# Patient Record
Sex: FEMALE
[De-identification: names, ages, dates, MRNs, and addresses within clinical notes are randomized per-mention and may not be internally consistent; named-entity substitution may affect disease eponyms.]

---
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
## 2024-10-02 ENCOUNTER — EMERGENCY (EMERGENCY)
Facility: HOSPITAL | Age: 24
LOS: 1 days | Discharge: HOME | End: 2024-10-02
Attending: STUDENT IN AN ORGANIZED HEALTH CARE EDUCATION/TRAINING PROGRAM | Admitting: STUDENT IN AN ORGANIZED HEALTH CARE EDUCATION/TRAINING PROGRAM
Payer: MEDICAID

## 2024-10-02 VITALS
OXYGEN SATURATION: 97 % | HEIGHT: 62 IN | SYSTOLIC BLOOD PRESSURE: 130 MMHG | TEMPERATURE: 98 F | RESPIRATION RATE: 16 BRPM | WEIGHT: 149.91 LBS | HEART RATE: 86 BPM | DIASTOLIC BLOOD PRESSURE: 84 MMHG

## 2024-10-02 LAB
APPEARANCE UR: CLEAR — SIGNIFICANT CHANGE UP
BACTERIA # UR AUTO: ABNORMAL /HPF
BILIRUB UR-MCNC: ABNORMAL
COLOR SPEC: ABNORMAL
DIFF PNL FLD: NEGATIVE — SIGNIFICANT CHANGE UP
EPI CELLS # UR: PRESENT
GLUCOSE UR QL: NEGATIVE MG/DL — SIGNIFICANT CHANGE UP
HCG UR QL: NEGATIVE — SIGNIFICANT CHANGE UP
HCV AB S/CO SERPL IA: 0.16 S/CO — SIGNIFICANT CHANGE UP (ref 0–0.99)
HCV AB SERPL-IMP: SIGNIFICANT CHANGE UP
HIV 1 & 2 AB SERPL IA.RAPID: SIGNIFICANT CHANGE UP
KETONES UR-MCNC: NEGATIVE MG/DL — SIGNIFICANT CHANGE UP
LEUKOCYTE ESTERASE UR-ACNC: ABNORMAL
NITRITE UR-MCNC: POSITIVE
PH UR: 5 — SIGNIFICANT CHANGE UP (ref 5–8)
PROT UR-MCNC: NEGATIVE MG/DL — SIGNIFICANT CHANGE UP
RBC CASTS # UR COMP ASSIST: 1 /HPF — SIGNIFICANT CHANGE UP (ref 0–4)
SP GR SPEC: 1.01 — SIGNIFICANT CHANGE UP (ref 1–1.03)
UROBILINOGEN FLD QL: 1 MG/DL — SIGNIFICANT CHANGE UP (ref 0.2–1)
WBC UR QL: 2 /HPF — SIGNIFICANT CHANGE UP (ref 0–5)

## 2024-10-02 PROCEDURE — 81001 URINALYSIS AUTO W/SCOPE: CPT

## 2024-10-02 PROCEDURE — 87086 URINE CULTURE/COLONY COUNT: CPT

## 2024-10-02 PROCEDURE — 99284 EMERGENCY DEPT VISIT MOD MDM: CPT

## 2024-10-02 PROCEDURE — 36415 COLL VENOUS BLD VENIPUNCTURE: CPT

## 2024-10-02 PROCEDURE — 87591 N.GONORRHOEAE DNA AMP PROB: CPT

## 2024-10-02 PROCEDURE — 99283 EMERGENCY DEPT VISIT LOW MDM: CPT | Mod: 25

## 2024-10-02 PROCEDURE — 86703 HIV-1/HIV-2 1 RESULT ANTBDY: CPT

## 2024-10-02 PROCEDURE — 86803 HEPATITIS C AB TEST: CPT

## 2024-10-02 PROCEDURE — 81025 URINE PREGNANCY TEST: CPT

## 2024-10-02 RX ORDER — ACETAMINOPHEN 325 MG/1
650 TABLET ORAL ONCE
Refills: 0 | Status: COMPLETED | OUTPATIENT
Start: 2024-10-02 | End: 2024-10-02

## 2024-10-02 RX ORDER — NITROFURANTOIN MONOHYD/M-CRYST 100 MG
100 CAPSULE ORAL ONCE
Refills: 0 | Status: COMPLETED | OUTPATIENT
Start: 2024-10-02 | End: 2024-10-02

## 2024-10-02 RX ORDER — NITROFURANTOIN MONOHYD/M-CRYST 100 MG
1 CAPSULE ORAL
Qty: 14 | Refills: 0
Start: 2024-10-02 | End: 2024-10-08

## 2024-10-02 RX ADMIN — ACETAMINOPHEN 650 MILLIGRAM(S): 325 TABLET ORAL at 01:58

## 2024-10-02 RX ADMIN — Medication 100 MILLIGRAM(S): at 03:46

## 2024-10-02 NOTE — ED PROVIDER NOTE - NSFOLLOWUPINSTRUCTIONS_ED_ALL_ED_FT
Please take the antibiotic as prescribed and follow up with your primary care doctor.  Return to the ER for persistent burning urination, fever, bloody urine, back pain, vomiting, abdominal pain, or any other concerns.     Urinary Tract Infection, Adult       A urinary tract infection (UTI) is an infection of any part of the urinary tract. The urinary tract includes the kidneys, ureters, bladder, and urethra. These organs make, store, and get rid of urine in the body.    Your health care provider may use other names to describe the infection. An upper UTI affects the ureters and kidneys (pyelonephritis). A lower UTI affects the bladder (cystitis) and urethra (urethritis).    What are the causes?  Most urinary tract infections are caused by bacteria in your genital area, around the entrance to your urinary tract (urethra). These bacteria grow and cause inflammation of your urinary tract.    What increases the risk?  You are more likely to develop this condition if:    You have a urinary catheter that stays in place (indwelling).  You are not able to control when you urinate or have a bowel movement (you have incontinence).  You are female and you:    Use a spermicide or diaphragm for birth control.  Have low estrogen levels.  Are pregnant.  You have certain genes that increase your risk (genetics).  You are sexually active.  You take antibiotic medicines.  You have a condition that causes your flow of urine to slow down, such as:    An enlarged prostate, if you are male.  Blockage in your urethra (stricture).  A kidney stone.  A nerve condition that affects your bladder control (neurogenic bladder).  Not getting enough to drink, or not urinating often.  You have certain medical conditions, such as:    Diabetes.  A weak disease-fighting system (immunesystem).  Sickle cell disease.  Gout.  Spinal cord injury.    What are the signs or symptoms?  Symptoms of this condition include:    Needing to urinate right away (urgently).  Frequent urination or passing small amounts of urine frequently.  Pain or burning with urination.  Blood in the urine.  Urine that smells bad or unusual.  Trouble urinating.  Cloudy urine.  Vaginal discharge, if you are female.  Pain in the abdomen or the lower back.    You may also have:    Vomiting or a decreased appetite.  Confusion.  Irritability or tiredness.  A fever.  Diarrhea.    The first symptom in older adults may be confusion. In some cases, they may not have any symptoms until the infection has worsened.    How is this diagnosed?  This condition is diagnosed based on your medical history and a physical exam. You may also have other tests, including:    Urine tests.  Blood tests.  Tests for sexually transmitted infections (STIs).    If you have had more than one UTI, a cystoscopy or imaging studies may be done to determine the cause of the infections.    How is this treated?  Treatment for this condition includes:    Antibiotic medicine.  Over-the-counter medicines to treat discomfort.  Drinking enough water to stay hydrated.    If you have frequent infections or have other conditions such as a kidney stone, you may need to see a health care provider who specializes in the urinary tract (urologist).    In rare cases, urinary tract infections can cause sepsis. Sepsis is a life-threatening condition that occurs when the body responds to an infection. Sepsis is treated in the hospital with IV antibiotics, fluids, and other medicines.    Follow these instructions at home:         Medicines    Take over-the-counter and prescription medicines only as told by your health care provider.  If you were prescribed an antibiotic medicine, take it as told by your health care provider. Do not stop using the antibiotic even if you start to feel better.        General instructions    Make sure you:    Empty your bladder often and completely. Do not hold urine for long periods of time.  Empty your bladder after sex.  Wipe from front to back after a bowel movement if you are female. Use each tissue one time when you wipe.  Drink enough fluid to keep your urine pale yellow.  Keep all follow-up visits as told by your health care provider. This is important.    Contact a health care provider if:  Your symptoms do not get better after 1–2 days.  Your symptoms go away and then return.    Get help right away if you have:  Severe pain in your back or your lower abdomen.  A fever.  Nausea or vomiting.    Summary  A urinary tract infection (UTI) is an infection of any part of the urinary tract, which includes the kidneys, ureters, bladder, and urethra.  Most urinary tract infections are caused by bacteria in your genital area, around the entrance to your urinary tract (urethra).  Treatment for this condition often includes antibiotic medicines.  If you were prescribed an antibiotic medicine, take it as told by your health care provider. Do not stop using the antibiotic even if you start to feel better.  Keep all follow-up visits as told by your health care provider. This is important.    ADDITIONAL NOTES AND INSTRUCTIONS    Please follow up with your Primary MD in 24-48 hr.  Seek immediate medical care for any new/worsening signs or symptoms.

## 2024-10-02 NOTE — ED PROVIDER NOTE - CLINICAL SUMMARY MEDICAL DECISION MAKING FREE TEXT BOX
24 year old female with no significant PMH presents with dysuria, urgency/frequency x 2 days.  Took Azo without relief.  No n/v//fever, abdominal pain.  Patient is requesting STD testing.  Check UA, Ucx, pregnancy, GC/Gonorrhea, HIV, hep C, abx as needed

## 2024-10-02 NOTE — ED PROVIDER NOTE - GASTROINTESTINAL, MLM
Abdomen soft, non-tender, non-distended, non rebound, no guarding. +BS, no CVA tenderness, no pulsatile mass

## 2024-10-02 NOTE — ED ADULT TRIAGE NOTE - CHIEF COMPLAINT QUOTE
Came in ambulatory in triage c/o lower back pain radiating to both flank area started 2 days ago that gotten worse tonight concerned for UTI. Denies fever/ chills/ nausea/ vomiting. Patient states have discomfort on urination. Came in ambulatory in triage c/o lower back pain radiating to both flank area started 2 days ago that gotten worse tonight concerned for UTI. Denies fever/ chills/ nausea/ vomiting. Patient states have discomfort on urination. Patient requesting work ups for STD's.

## 2024-10-02 NOTE — ED PROVIDER NOTE - PATIENT PORTAL LINK FT
You can access the FollowMyHealth Patient Portal offered by [facility] by registering at the following website: [email or web address]. By joining [facility]’s FollowMyHealth portal, you will also be able to view your health information using other applications (apps) compatible with our system.

## 2024-10-02 NOTE — ED ADULT NURSE NOTE - OBJECTIVE STATEMENT
Pt arrived to ED c/o urinary pain/burning started yesterday and today started having lower back/ bilat flank pain. Urine appears red but clear. Pt reports she took AZO for UTI w/ no relief and that may cause color change in urine. Awaiting orders. Denies CP/SOB/N/V/D/f/c. Bed in lowest position, family at bedside, safety maintained, nursing care ongoing.

## 2024-10-02 NOTE — ED ADULT NURSE NOTE - CHIEF COMPLAINT QUOTE
Came in ambulatory in triage c/o lower back pain radiating to both flank area started 2 days ago that gotten worse tonight concerned for UTI. Denies fever/ chills/ nausea/ vomiting. Patient states have discomfort on urination. Patient requesting work ups for STD's.

## 2024-10-02 NOTE — ED PROVIDER NOTE - NSFOLLOWUPCLINICS_GEN_ALL_ED_FT
[facility] General Internal Medicine  General Internal Medicine  [address] [place] [address]  Phone: [phone]  Fax:     [facility]  Internal Medicine  [address] [place] [id number]  Phone: [phone]  Fax:

## 2024-10-02 NOTE — ED PROVIDER NOTE - OBJECTIVE STATEMENT
24-year-old female with no significant past medical history presents with urinary frequency, decreased urinary quantity, and mild dysuria since yesterday.  Patient took over-the-counter Azo with no relief.  The symptoms are associated with generalized low back pain.  Patient denies fever, vomiting, flank pain, chills, abdominal pain.  Patient is requesting STI testing because she recently broke up with her boyfriend and found out that he was cheating on her.  She denies vaginal itching, malodorous discharge.  No PMD. 24-year-old female with no significant past medical history presents with urinary frequency, decreased urinary quantity, and mild dysuria since yesterday.  Patient took over-the-counter Azo with no relief.  The symptoms are associated with generalized low back pain.  Patient denies fever, vomiting, flank pain, chills, abdominal pain.  Patient is requesting STI testing because she recently broke up with her boyfriend and found out that he was cheating on her.  She denies vaginal itching, lesions, malodorous discharge.  No PMD.

## 2024-10-02 NOTE — ED ADULT NURSE NOTE - NSFALLUNIVINTERV_ED_ALL_ED
Bed/Stretcher in lowest position, wheels locked, appropriate side rails in place/Call bell, personal items and telephone in reach/Instruct patient to call for assistance before getting out of bed/chair/stretcher/Non-slip footwear applied when patient is off stretcher/[place] to call system/Physically safe environment - no spills, clutter or unnecessary equipment/Purposeful proactive rounding/Room/bathroom lighting operational, light cord in reach

## 2024-10-03 LAB
CULTURE RESULTS: SIGNIFICANT CHANGE UP
N GONORRHOEA RRNA SPEC QL NAA+PROBE: SIGNIFICANT CHANGE UP
SPECIMEN SOURCE: SIGNIFICANT CHANGE UP
SPECIMEN SOURCE: SIGNIFICANT CHANGE UP